# Patient Record
Sex: MALE | Race: WHITE | NOT HISPANIC OR LATINO | Employment: FULL TIME | URBAN - METROPOLITAN AREA
[De-identification: names, ages, dates, MRNs, and addresses within clinical notes are randomized per-mention and may not be internally consistent; named-entity substitution may affect disease eponyms.]

---

## 2018-08-08 ENCOUNTER — OFFICE VISIT (OUTPATIENT)
Dept: FAMILY MEDICINE | Facility: CLINIC | Age: 28
End: 2018-08-08
Payer: COMMERCIAL

## 2018-08-08 VITALS
WEIGHT: 181 LBS | TEMPERATURE: 98.4 F | HEART RATE: 66 BPM | SYSTOLIC BLOOD PRESSURE: 120 MMHG | DIASTOLIC BLOOD PRESSURE: 70 MMHG | BODY MASS INDEX: 23.99 KG/M2 | HEIGHT: 73 IN

## 2018-08-08 DIAGNOSIS — Z00.00 ROUTINE GENERAL MEDICAL EXAMINATION AT A HEALTH CARE FACILITY: Primary | ICD-10-CM

## 2018-08-08 DIAGNOSIS — Z82.49 FAMILY HISTORY OF CARDIAC ARRHYTHMIA: ICD-10-CM

## 2018-08-08 DIAGNOSIS — B97.7 HPV (HUMAN PAPILLOMA VIRUS) INFECTION: ICD-10-CM

## 2018-08-08 PROCEDURE — 93000 ELECTROCARDIOGRAM COMPLETE: CPT | Performed by: FAMILY MEDICINE

## 2018-08-08 PROCEDURE — 99395 PREV VISIT EST AGE 18-39: CPT | Mod: 25 | Performed by: FAMILY MEDICINE

## 2018-08-08 PROCEDURE — 54050 DESTRUCTION PENIS LESION(S): CPT | Performed by: FAMILY MEDICINE

## 2018-08-08 ASSESSMENT — ENCOUNTER SYMPTOMS
HEARTBURN: 0
MYALGIAS: 0
HEADACHES: 0
EYE PAIN: 0
SHORTNESS OF BREATH: 0
SORE THROAT: 0
DIARRHEA: 0
COUGH: 0
NERVOUS/ANXIOUS: 0
FREQUENCY: 0
DIZZINESS: 0
CONSTIPATION: 0
PALPITATIONS: 0
WEAKNESS: 0
ARTHRALGIAS: 0
CHILLS: 0
ABDOMINAL PAIN: 0
NAUSEA: 0
HEMATURIA: 0
JOINT SWELLING: 0
DYSURIA: 0
FEVER: 0
HEMATOCHEZIA: 0
PARESTHESIAS: 0

## 2018-08-08 NOTE — MR AVS SNAPSHOT
After Visit Summary   8/8/2018    Gus Segundo    MRN: [id number]           Patient Information     Date Of Birth          1990        Visit Information        Provider Department      8/8/2018 3:20 PM Ramy French MD [facility]        Today's Diagnoses     Routine general medical examination at a health care facility    -  1    Family history of cardiac arrhythmia        HPV (human papilloma virus) infection          Care Instructions      Preventive Health Recommendations  Male Ages 26 - 39    Yearly exam:             See your health care provider every year in order to  o   Review health changes.   o   Discuss preventive care.    o   Review your medicines if your doctor has prescribed any.    You should be tested each year for STDs (sexually transmitted diseases), if you re at risk.     After age 35, talk to your provider about cholesterol testing. If you are at risk for heart disease, have your cholesterol tested at least every 5 years.     If you are at risk for diabetes, you should have a diabetes test (fasting glucose).  Shots: Get a flu shot each year. Get a tetanus shot every 10 years.     Nutrition:    Eat at least 5 servings of fruits and vegetables daily.     Eat whole-grain bread, whole-wheat pasta and brown rice instead of white grains and rice.     Get adequate Calcium and Vitamin D.     Lifestyle    Exercise for at least 150 minutes a week (30 minutes a day, 5 days a week). This will help you control your weight and prevent disease.     Limit alcohol to one drink per day.     No smoking.     Wear sunscreen to prevent skin cancer.     See your dentist every six months for an exam and cleaning.       [facility]   Discharged by : naveen  Paper scripts provided to patient :  none     If you have any questions regarding your visit please contact your care team:     Team Gold                Clinic Hours Telephone Number     Dr. Hernandez  Srikanth Montillarhonda Jeffjina, CNP 7am-7pm  Monday - Thursday   7am-5pm  Fridays  [phone]   (Appointment scheduling available 24/7)     RN Line  [phone] option 2     Urgent Care - [place] - 11am-9pm Monday-Friday Saturday-Sunday- 9am-5pm     [place] -   5pm-9pm Monday-Friday Saturday-Sunday- 9am-5pm    [phone] - [place]    [phone] - [place]       For a Price Quote for your services, please call our Consumer Price Line at [phone].     What options do I have for visits at the clinic other than the traditional office visit?     To expand how we care for you, many of our providers are utilizing electronic visits (e-visits) and telephone visits, when medically appropriate, for interactions with their patients rather than a visit in the clinic. We also offer nurse visits for many medical concerns. Just like any other service, we will bill your insurance company for this type of visit based on time spent on the phone with your provider. Not all insurance companies cover these visits. Please check with your medical insurance if this type of visit is covered. You will be responsible for any charges that are not paid by your insurance.   E-visits via [employer]Jar: generally incur a $35.00 fee.     Telephone visits:  Time spent on the phone: *charged based on time that is spent on the phone in increments of 10 minutes. Estimated cost:   5-10 mins $30.00   11-20 mins. $59.00   21-30 mins. $85.00       Use [employer] (secure email communication and access to your chart) to send your primary care provider a message or make an appointment. Ask someone on your Team how to sign up for [employer].     As always, Thank you for trusting us with your health care needs!      [facility]:    Scheduling Appointments  [facility]  Call: [phone]    [facility]  Call:  [phone]    [facility]  Call: [phone]    For Gastroenterology referrals   [facility]   Clinics and Surgery Center, 4th Floor   [address] [place] [address]   Appointments: [phone]    WHERE TO GO FOR CARE?  Clinic    Make an appointment if you:       Are sick (cold, cough, flu, sore throat, earache or in pain).       Have a small injury (sprain, small cut, burn or broken bone).       Need a physical exam, Pap smear, vaccine or prescription refill.       Have questions about your health or medicines.    To reach us:      Call [phone] ([phone]). Open 24 hours every day. (For counseling services, call [phone].)    Log into [employer] at [employer]. (Visit [email or web address] to create an account.) Hospital emergency room    An emergency is a serious or life- threatening problem that must be treated right away.    Call 675 or get to the hospital if you have:      Very bad or sudden:            - Chest pain or pressure         - Bleeding         - Head or belly pain         - Dizziness or trouble seeing, walking or                          Speaking      Problems breathing      Blood in your vomit or you are coughing up blood      A major injury (knocked out, loss of a finger or limb, rape, broken bone protruding from skin)    A mental health crisis. (Or call the Mental Health Crisis line at 1-449.659.4371 or Suicide Prevention Hotline at 1-701.105.4687.)    Open 24 hours every day. You don't need an appointment.     Urgent care    Visit urgent care for sickness or small injuries when the clinic is closed. You don't need an appointment. To check hours or find an urgent care near you, visit [email or web address]. Online care    Get online care from [facility] for more than 70 common problems, like colds, allergies and infections. Open 24 hours every day at:   [email or web address]   Need help deciding?    For advice about where to be seen,  "you may call your clinic and ask to speak with a nurse. We're here for you 24 hours every day.         If you are deaf or hard of hearing, please let us know. We provide many free services including sign language interpreters, oral interpreters, TTYs, telephone amplifiers, note takers and written materials.                   Follow-ups after your visit        Follow-up notes from your care team     Return in about 1 year (around 8/8/2019) for Physical Exam.      Who to contact     If you have questions or need follow up information about today's clinic visit or your schedule please contact [facility] directly at [phone].  Normal or non-critical lab and imaging results will be communicated to you by MyChart, letter or phone within 4 business days after the clinic has received the results. If you do not hear from us within 7 days, please contact the clinic through PrivacyCentralhart or phone. If you have a critical or abnormal lab result, we will notify you by phone as soon as possible.  Submit refill requests through [employer] or call your pharmacy and they will forward the refill request to us. Please allow 3 business days for your refill to be completed.          Additional Information About Your Visit        PrivacyCentralharNeventum Information     [employer] gives you secure access to your electronic health record. If you see a primary care provider, you can also send messages to your care team and make appointments. If you have questions, please call your primary care clinic.  If you do not have a primary care provider, please call [phone] and they will assist you.        Care EveryWhere ID     This is your Care EveryWhere ID. This could be used by other organizations to access your [place] medical records  [id number]        Your Vitals Were     Pulse Temperature Height BMI (Body Mass Index)          66 98.4  F (36.9  C) (Oral) 6' 0.5\" (1.842 m) 24.21 kg/m2         Blood Pressure from Last 3 Encounters: "   08/08/18 120/70   10/14/16 122/62   10/03/16 136/67    Weight from Last 3 Encounters:   08/08/18 181 lb (82.1 kg)   10/14/16 186 lb (84.4 kg)   10/03/16 187 lb 14.4 oz (85.2 kg)              We Performed the Following     EKG 12-lead complete w/read - Clinics        Primary Care Provider Office Phone # Fax #    Ramy French [phone]       [address] [facility] [address]        Equal Access to Services     [facility]: Hadii aad ku hadasho Soomaali, waaxda luqadaha, qaybta kaalmada adeegyaevelia, tr stratton . So [facility] [phone].    ATENCIÓN: Si habla español, tiene a garcia disposición servicios gratuitos de asistencia lingüística. [facility] [phone].    We comply with applicable federal civil rights laws and [place] laws. We do not discriminate on the basis of race, color, national origin, age, disability, sex, sexual orientation, or gender identity.            Thank you!     Thank you for choosing [facility]  for your care. Our goal is always to provide you with excellent care. Hearing back from our patients is one way we can continue to improve our services. Please take a few minutes to complete the written survey that you may receive in the mail after your visit with us. Thank you!             Your Updated Medication List - Protect others around you: Learn how to safely use, store and throw away your medicines at www.disposemymeds.org.      Notice  As of 8/8/2018  4:26 PM    You have not been prescribed any medications.

## 2018-08-08 NOTE — PATIENT INSTRUCTIONS
Preventive Health Recommendations  Male Ages 26 - 39    Yearly exam:             See your health care provider every year in order to  o   Review health changes.   o   Discuss preventive care.    o   Review your medicines if your doctor has prescribed any.    You should be tested each year for STDs (sexually transmitted diseases), if you re at risk.     After age 35, talk to your provider about cholesterol testing. If you are at risk for heart disease, have your cholesterol tested at least every 5 years.     If you are at risk for diabetes, you should have a diabetes test (fasting glucose).  Shots: Get a flu shot each year. Get a tetanus shot every 10 years.     Nutrition:    Eat at least 5 servings of fruits and vegetables daily.     Eat whole-grain bread, whole-wheat pasta and brown rice instead of white grains and rice.     Get adequate Calcium and Vitamin D.     Lifestyle    Exercise for at least 150 minutes a week (30 minutes a day, 5 days a week). This will help you control your weight and prevent disease.     Limit alcohol to one drink per day.     No smoking.     Wear sunscreen to prevent skin cancer.     See your dentist every six months for an exam and cleaning.       [facility]   Discharged by : naveen  Paper scripts provided to patient :  none     If you have any questions regarding your visit please contact your care team:     Team Gold                Clinic Hours Telephone Number     Dr. Mary Pearson, CNP 7am-7pm  Monday - Thursday   7am-5pm  Fridays  [phone]   (Appointment scheduling available 24/7)     RN Line  [phone] option 2     Urgent Care - [place] - 11am-9pm Monday-Friday Saturday-Sunday- 9am-5pm     [place] -   5pm-9pm Monday-Friday Saturday-Sunday- 9am-5pm    [phone] - [place]    [phone] - [place]       For a Price Quote for your services,  please call our Consumer Price Line at [phone].     What options do I have for visits at the clinic other than the traditional office visit?     To expand how we care for you, many of our providers are utilizing electronic visits (e-visits) and telephone visits, when medically appropriate, for interactions with their patients rather than a visit in the clinic. We also offer nurse visits for many medical concerns. Just like any other service, we will bill your insurance company for this type of visit based on time spent on the phone with your provider. Not all insurance companies cover these visits. Please check with your medical insurance if this type of visit is covered. You will be responsible for any charges that are not paid by your insurance.   E-visits via [employer]: generally incur a $35.00 fee.     Telephone visits:  Time spent on the phone: *charged based on time that is spent on the phone in increments of 10 minutes. Estimated cost:   5-10 mins $30.00   11-20 mins. $59.00   21-30 mins. $85.00       Use [employer] (secure email communication and access to your chart) to send your primary care provider a message or make an appointment. Ask someone on your Team how to sign up for [employer].     As always, Thank you for trusting us with your health care needs!      [facility]:    Scheduling Appointments  [place] [facility]  Call: [phone]    [facility]  Call: [phone]    [facility]  Call: [phone]    For Gastroenterology referrals   [facility] Gastroenterology   Clinics and Surgery Center, 4th Floor   [address] [place] [id number]   Appointments: [phone]    WHERE TO GO FOR CARE?  Clinic    Make an appointment if you:       Are sick (cold, cough, flu, sore throat, earache or in pain).       Have a small injury (sprain, small cut, burn or broken bone).       Need a physical exam, Pap smear, vaccine or  prescription refill.       Have questions about your health or medicines.    To reach us:      Call [phone] ([phone]). Open 24 hours every day. (For counseling services, call [phone].)    Log into [employer] at [employer]. (Visit [email or web address] to create an account.) Hospital emergency room    An emergency is a serious or life- threatening problem that must be treated right away.    Call 911 or get to the hospital if you have:      Very bad or sudden:            - Chest pain or pressure         - Bleeding         - Head or belly pain         - Dizziness or trouble seeing, walking or                          Speaking      Problems breathing      Blood in your vomit or you are coughing up blood      A major injury (knocked out, loss of a finger or limb, rape, broken bone protruding from skin)    A mental health crisis. (Or call the Mental Health Crisis line at 1-244.113.2920 or Suicide Prevention Hotline at 1-493.141.8658.)    Open 24 hours every day. You don't need an appointment.     Urgent care    Visit urgent care for sickness or small injuries when the clinic is closed. You don't need an appointment. To check hours or find an urgent care near you, visit [email or web address]. Online care    Get [facility] from [facility] for more than 70 common problems, like colds, allergies and infections. Open 24 hours every day at:   [email or web address]   Need help deciding?    For advice about where to be seen, you may call your clinic and ask to speak with a nurse. We're here for you 24 hours every day.         If you are deaf or hard of hearing, please let us know. We provide many free services including sign language interpreters, oral interpreters, TTYs, telephone amplifiers, note takers and written materials.

## 2018-08-08 NOTE — PROGRESS NOTES
SUBJECTIVE:   CC: Gus Segundo is an 28 year old male who presents for preventative health visit.     Physical   Annual:     Getting at least 3 servings of Calcium per day:  Yes    Bi-annual eye exam:  NO    Dental care twice a year:  Yes    Sleep apnea or symptoms of sleep apnea:  None    Diet:  Regular (no restrictions)    Frequency of exercise:  6-7 days/week    Duration of exercise:  45-60 minutes    Taking medications regularly:  Yes    Medication side effects:  Not applicable    Additional concerns today:  YES        Other Concerns:  Genetic family heart condition in relatives- ARCV ?   He has no symptoms at this time.  Exercise tolerance good.  Cousin is son of maternal aunt.  Genetics unknown.  Female cousin (sister to make cousin affected) is also affected.  Presented in cousin age 40's, sister was asymptomatic.  Referral to cardiology? Maybe get baseline EKG    Also has some concern regarding warts in the pubic trigone.      Today's PHQ-2 Score:   PHQ-2 ( 1999 Pfizer) 8/8/2018   Q1: Little interest or pleasure in doing things 0   Q2: Feeling down, depressed or hopeless 0   PHQ-2 Score 0   Q1: Little interest or pleasure in doing things Not at all   Q2: Feeling down, depressed or hopeless Not at all   PHQ-2 Score 0       Abuse: Current or Past(Physical, Sexual or Emotional)- No  Do you feel safe in your environment - Yes    Social History   Substance Use Topics     Smoking status: Never Smoker     Smokeless tobacco: Never Used     Alcohol use Yes      Comment: 1-2 Drinks per week     Alcohol Use 8/8/2018   If you drink alcohol do you typically have greater than 3 drinks per day OR greater than 7 drinks per week? No   No flowsheet data found.    Last PSA: No results found for: PSA    Reviewed orders with patient. Reviewed health maintenance and updated orders accordingly - Yes  There is no problem list on file for this patient.    Past Surgical History:   Procedure Laterality Date     PHOTOREFRACTIVE  "KERATECTOMY Bilateral     vision correction       Social History   Substance Use Topics     Smoking status: Never Smoker     Smokeless tobacco: Never Used     Alcohol use Yes      Comment: 1-2 Drinks per week     Family History   Problem Relation Age of Onset     Asthma Father      Coronary Artery Disease Maternal Grandmother      Diabetes Maternal Grandmother      Seizure Disorder Sister      child, improved with growth     Heart Defect Cousin      Genetic condition -ARVC         No current outpatient prescriptions on file.     No Known Allergies    Reviewed and updated as needed this visit by clinical staff  Tobacco  Allergies  Meds  Problems  Med Hx  Surg Hx  Fam Hx  Soc Hx          Reviewed and updated as needed this visit by Provider  Tobacco  Allergies  Meds  Problems  Med Hx  Surg Hx  Fam Hx  Soc Hx         History reviewed. No pertinent past medical history.   Past Surgical History:   Procedure Laterality Date     PHOTOREFRACTIVE KERATECTOMY Bilateral     vision correction       Review of Systems   Constitutional: Negative for chills and fever.   HENT: Positive for congestion. Negative for ear pain, hearing loss and sore throat.    Eyes: Negative for pain and visual disturbance.   Respiratory: Negative for cough and shortness of breath.    Cardiovascular: Negative for chest pain, palpitations and peripheral edema.   Gastrointestinal: Negative for abdominal pain, constipation, diarrhea, heartburn, hematochezia and nausea.   Genitourinary: Positive for genital sores. Negative for discharge, dysuria, frequency, hematuria, impotence and urgency.   Musculoskeletal: Negative for arthralgias, joint swelling and myalgias.   Skin: Negative for rash.   Neurological: Negative for dizziness, weakness, headaches and paresthesias.   Psychiatric/Behavioral: Negative for mood changes. The patient is not nervous/anxious.          OBJECTIVE:   /70  Pulse 66  Temp 98.4  F (36.9  C) (Oral)  Ht 6' 0.5\" " (1.842 m)  Wt 181 lb (82.1 kg)  BMI 24.21 kg/m2    Physical Exam  GENERAL: healthy, alert and no distress  EYES: Eyes grossly normal to inspection, PERRL and conjunctivae and sclerae normal  HENT: ear canals and TM's normal, nose and mouth without ulcers or lesions  NECK: no adenopathy, no asymmetry, masses, or scars and thyroid normal to palpation  RESP: lungs clear to auscultation - no rales, rhonchi or wheezes  CV: regular rate and rhythm, normal S1 S2, no S3 or S4, no murmur, click or rub, no peripheral edema and peripheral pulses strong  ABDOMEN: soft, nontender, no hepatosplenomegaly, no masses and bowel sounds normal   (male): normal male genitalia without lesions or urethral discharge, no hernia   (male): two 2-3 mm flesh colored lesions at the base of the penis  MS: no gross musculoskeletal defects noted, no edema  SKIN: no suspicious lesions or rashes  NEURO: Normal strength and tone, mentation intact and speech normal  PSYCH: mentation appears normal, affect normal/bright    Diagnostic Test Results:  none   EKG - sinus bradycardia, normal axis, normal intervals, no acute ST/T changes c/w ischemia, no LVH by voltage criteria, there are no prior tracings available, I do not appreciate any of the changes suggestive ARVD.    ASSESSMENT/PLAN:   1. Routine general medical examination at a health care facility  Routine wellness issues for age were reviewed.  Recheck 1-3 years.    2. Family history of cardiac arrhythmia  EKG done today and no concerning findings.  Discussed genetic risk which seems low at this time given occurrence on cousin and not first degree relative.  Follow up if any symptom concerns and/or if genetics get clarified and it is present on his side of the family (maternal aunt and patient's mother).  - EKG 12-lead complete w/read - Clinics    3. HPV (human papilloma virus) infection  LN x 2 today to each of the lesions, follow up prn.  Ok to monitor as well.      COUNSELING:   Reviewed  "preventive health counseling, as reflected in patient instructions       Regular exercise       Healthy diet/nutrition       Vision screening       Hearing screening       Safe sex practices/STD prevention    BP Readings from Last 1 Encounters:   08/08/18 120/70     Estimated body mass index is 24.21 kg/(m^2) as calculated from the following:    Height as of this encounter: 6' 0.5\" (1.842 m).    Weight as of this encounter: 181 lb (82.1 kg).           reports that he has never smoked. He has never used smokeless tobacco.      Counseling Resources:  ATP IV Guidelines  Pooled Cohorts Equation Calculator  FRAX Risk Assessment  ICSI Preventive Guidelines  Dietary Guidelines for Americans, 2010  USDA's MyPlate  ASA Prophylaxis  Lung CA Screening    Ramy French MD  [facility]  "

## 2019-09-30 ENCOUNTER — HEALTH MAINTENANCE LETTER (OUTPATIENT)
Age: 29
End: 2019-09-30

## 2021-01-15 ENCOUNTER — HEALTH MAINTENANCE LETTER (OUTPATIENT)
Age: 31
End: 2021-01-15

## 2021-10-24 ENCOUNTER — HEALTH MAINTENANCE LETTER (OUTPATIENT)
Age: 31
End: 2021-10-24

## 2022-02-13 ENCOUNTER — HEALTH MAINTENANCE LETTER (OUTPATIENT)
Age: 32
End: 2022-02-13

## 2022-10-15 ENCOUNTER — HEALTH MAINTENANCE LETTER (OUTPATIENT)
Age: 32
End: 2022-10-15

## 2023-02-11 ENCOUNTER — HOSPITAL ENCOUNTER (EMERGENCY)
Facility: HOSPITAL | Age: 33
Discharge: HOME | End: 2023-02-12
Attending: EMERGENCY MEDICINE | Admitting: EMERGENCY MEDICINE
Payer: COMMERCIAL

## 2023-02-11 DIAGNOSIS — T15.92XA ACUTE FOREIGN BODY OF LEFT EYE, INITIAL ENCOUNTER: ICD-10-CM

## 2023-02-11 DIAGNOSIS — S05.02XA ABRASION OF LEFT CORNEA, INITIAL ENCOUNTER: ICD-10-CM

## 2023-02-11 PROCEDURE — 99284 EMERGENCY DEPT VISIT MOD MDM: CPT

## 2023-02-11 PROCEDURE — 250N000009 HC RX 250: Performed by: EMERGENCY MEDICINE

## 2023-02-11 RX ORDER — TETRACAINE HYDROCHLORIDE 5 MG/ML
1-2 SOLUTION OPHTHALMIC ONCE
Status: COMPLETED | OUTPATIENT
Start: 2023-02-12 | End: 2023-02-11

## 2023-02-11 RX ADMIN — FLUORESCEIN SODIUM 1 STRIP: 1 STRIP OPHTHALMIC at 23:48

## 2023-02-11 RX ADMIN — TETRACAINE HYDROCHLORIDE 2 DROP: 5 SOLUTION OPHTHALMIC at 23:48

## 2023-02-12 VITALS
OXYGEN SATURATION: 97 % | WEIGHT: 185 LBS | HEIGHT: 72 IN | SYSTOLIC BLOOD PRESSURE: 130 MMHG | TEMPERATURE: 98 F | HEART RATE: 62 BPM | DIASTOLIC BLOOD PRESSURE: 77 MMHG | BODY MASS INDEX: 25.06 KG/M2 | RESPIRATION RATE: 16 BRPM

## 2023-02-12 PROCEDURE — 250N000009 HC RX 250: Performed by: EMERGENCY MEDICINE

## 2023-02-12 RX ORDER — SOFT LENS RINSE,STORE SOLUTION
15 SOLUTION, NON-ORAL MISCELLANEOUS ONCE
Status: DISCONTINUED | OUTPATIENT
Start: 2023-02-12 | End: 2023-02-12

## 2023-02-12 RX ORDER — PURIFIED WATER 986 MG/ML
1 SOLUTION OPHTHALMIC ONCE
Status: COMPLETED | OUTPATIENT
Start: 2023-02-12 | End: 2023-02-12

## 2023-02-12 RX ORDER — ERYTHROMYCIN 5 MG/G
OINTMENT OPHTHALMIC ONCE
Status: COMPLETED | OUTPATIENT
Start: 2023-02-12 | End: 2023-02-12

## 2023-02-12 RX ORDER — HYDROCODONE BITARTRATE AND ACETAMINOPHEN 5; 325 MG/1; MG/1
1 TABLET ORAL EVERY 8 HOURS PRN
Qty: 6 TABLET | Refills: 0 | Status: SHIPPED | OUTPATIENT
Start: 2023-02-12 | End: 2023-02-14

## 2023-02-12 RX ORDER — ERYTHROMYCIN 5 MG/G
0.5 OINTMENT OPHTHALMIC 4 TIMES DAILY
Qty: 3.5 G | Refills: 0 | Status: SHIPPED | OUTPATIENT
Start: 2023-02-12

## 2023-02-12 RX ADMIN — ERYTHROMYCIN: 5 OINTMENT OPHTHALMIC at 00:58

## 2023-02-12 RX ADMIN — PURIFIED WATER 1 DROP: 986 SOLUTION OPHTHALMIC at 00:56

## 2023-02-12 ASSESSMENT — ACTIVITIES OF DAILY LIVING (ADL): ADLS_ACUITY_SCORE: 35

## 2023-02-12 ASSESSMENT — VISUAL ACUITY
OS: 20/15
OU: 20/15
OD: 20/15

## 2023-02-12 NOTE — ED TRIAGE NOTES
Pt reports that he was doing some home renovations today working with fiberglass and dry wall. Pt did have eye protection on during work. Pt finished working at 1600. At about 2100 he started having pain in his left eye. Pt is trouble opening the left eye and reports pain. Pt tried flushing his eye at home but is still having problems.

## 2023-02-12 NOTE — ED PROVIDER NOTES
EMERGENCY DEPARTMENT NOTE     Name: Gus Segundo    Age/Sex: 32 year old male   MRN: [id number]   Evaluation Date & Time:  2/11/2023 11:36 PM    PCP:    Ramy French   ED Provider: Alex Mustafa D.O.       CHIEF COMPLAINT    Eye Problem       DIAGNOSIS & DISPOSITION     1. Acute foreign body of left eye, initial encounter    2. Abrasion of left cornea, initial encounter      DISPOSITION:Home    At the conclusion of the encounter I discussed the results of all of the tests and the disposition. The questions were answered. The patient or family acknowledged understanding and was agreeable with the care plan.    TOTAL CRITICAL CARE TIME (EXCLUDING PROCEDURES): Not applicable    PROCEDURES:   None    EMERGENCY DEPARTMENT COURSE/MEDICAL DECISION MAKING   11:42 PM I met with the patient to gather history and to perform my initial exam.  We discussed treatment options and the plan for care while in the Emergency Department.    Gus Segundo is a 32 year old male with relevant past history of s/p photorefractive keratectomy who presents to the emergency department for evaluation of an eye pain after putting in drywall and fiber glass insulation today.         Triage note reviewed:Pt reports that he was doing some home renovations today working with fiberglass and dry wall. Pt did have eye protection on during work. Pt finished working at 1600. At about 2100 he started having pain in his left eye. Pt is trouble opening the left eye and reports pain. Pt tried flushing his eye at home but is still having problems.         Differential  diagnosis  considered included but not limited to corneal abrasion, ocular foreign body, iritis  Diagnostic studies:  Imaging:  No orders to display      Lab:  Labs Ordered and Resulted from Time of ED Arrival to Time of ED Departure - No data to display   Interventions: Tetracaine, erythromycin eye ointment, eye irrigation  Medical decision making: Patient on exam found to have  large foreign body which appeared to be a piece of drywall paper which was removed.  Slit-lamp exam showed extensive corneal abrasion approximately 70% with no deep ulceration.  The acuity was 20/15 each eye.  Patient will be discharged.  Continue erythromycin ointment every 4 hours.  He was given short course of Vicodin for pain not improved with Tylenol or ibuprofen.  Patient is given referral to [facility] with recommendation for follow-up within 48 hours.  Return criteria discussed if uncontrolled pain or problems with follow-up or return to the emergency department.  Medical Decision Making    History:    Supplemental history from: Documented in chart, if applicable    External Record(s) reviewed: Documented in chart, if applicable.    Work Up:    Chart documentation includes differential considered and any EKGs or imaging independently interpreted by provider, where specified.    In additional to work up documented, I considered the following work up: Documented in chart, if applicable. and N/A    External consultation:    Discussion of management with another provider: Documented in chart, if applicable    Complicating factors:    Care impacted by chronic illness: N/A    Care affected by social determinants of health: N/A    Disposition considerations: Discharge. I prescribed additional prescription strength medication(s) as charted. N/A.      ED INTERVENTIONS     Medications   tetracaine (PONTOCAINE) 0.5 % ophthalmic solution 1-2 drop (2 drops Left Eye Given by Other 2/11/23 2348)   fluorescein (FUL-DO) ophthalmic strip 1 strip (1 strip Right Eye Given by Other 2/11/23 2348)   erythromycin (ROMYCIN) ophthalmic ointment ( Right Eye Given 2/12/23 0058)   balanced salts (EYE STREAM) ophthalmic solution 1 drop (1 drop Left Eye Given 2/12/23 0056)       DISCHARGE MEDICATIONS        Review of your medicines      START taking      Dose / Directions   erythromycin 5 MG/GM ophthalmic ointment  Commonly known as:  ROMYCIN      Dose: 0.5 inch  Place 0.5 inches Into the left eye 4 times daily  Quantity: 3.5 g  Refills: 0     HYDROcodone-acetaminophen 5-325 MG tablet  Commonly known as: NORCO      Dose: 1 tablet  Take 1 tablet by mouth every 8 hours as needed for severe pain (7-10)  Quantity: 6 tablet  Refills: 0           Where to get your medicines      Some of these will need a paper prescription and others can be bought over the counter. Ask your nurse if you have questions.    Bring a paper prescription for each of these medications    erythromycin 5 MG/GM ophthalmic ointment    HYDROcodone-acetaminophen 5-325 MG tablet           INFORMATION SOURCE AND LIMITATIONS    History/Exam limitations: None.   Patient information was obtained from: the patient   Use of : N/A    HISTORY OF PRESENT ILLNESS   Gus Segundo is a 32 year old year old male with a relevant past history of photorefractive keratectomy, who presents to this ED via walk in for evaluation of eye pain.    The patient reports the sudden onset of left eye pain this evening around 2100 while he was taking a shower. He washed the eye out with water and saline contact solution, neither of which provided relief. He notes that he did put in fiber glass insulation this afternoon and didn't change out of his work clothes until he took a shower this evening, but he did wear safety glasses while putting in the insulation. He denies wearing contacts. The patient denies any other symptoms or complaints at this time.     REVIEW OF SYSTEMS:   Constitutional: Negative for  fever.   HENT: Negative for URI symptoms or sore throat. Positive for left eye pain  Cardiac: Negative for  chest pain,palpitations, near syncope or syncope  Respiratory: Negative for cough and shortness of breath.    Gastrointestinal: Negative for abdominal pain, nausea, vomiting, constipation, diarrhea, rectal bleeding or melena.  Genitourinary: Negative for dysuria, flank pain and hematuria.    Musculoskeletal: Negative for back pain.   Skin: Negative for  rash  Neurological: Negative for dizziness, headache, syncope, speech difficulty, unilateral weakness or imbalance with walking.   Hematological: Negative for adenopathy. Does not bruise/bleed easily.   Psychiatric/Behavioral: Negative for confusion.     PATIENT HISTORY   No past medical history on file.  There is no problem list on file for this patient.    Past Surgical History:   Procedure Laterality Date     PHOTOREFRACTIVE KERATECTOMY Bilateral     vision correction     Social Histrory  Smoking: Never  Alcohol Use: Not asked  Drug Use: Not asked  No Known Allergies    OUTPATIENT MEDICATIONS     Discharge Medication List as of 2/12/2023 12:54 AM      START taking these medications    Details   erythromycin (ROMYCIN) 5 MG/GM ophthalmic ointment Place 0.5 inches Into the left eye 4 times dailyDisp-3.5 g, R-0Local Print      HYDROcodone-acetaminophen (NORCO) 5-325 MG tablet Take 1 tablet by mouth every 8 hours as needed for severe pain (7-10), Disp-6 tablet, R-0, Local Print            Vitals:    02/11/23 2332 02/12/23 0104   BP: (!) 140/77 130/77   Pulse: 59 62   Resp: 16    Temp: 98  F (36.7  C)    TempSrc: Oral    SpO2: 100% 97%   Weight: 83.9 kg (185 lb)    Height: 1.829 m (6')        Physical Exam   Constitutional: Oriented to person, place, and time. Appears well-developed and well-nourished.   HEENT:    Head: Atraumatic.   Neck: Normal range of motion. Neck supple.   Cardiovascular: Normal rate, regular rhythm and normal heart sounds.    Pulmonary/Chest: Normal effort  and breath sounds normal.   Abdominal: Soft. Bowel sounds are normal.   Musculoskeletal: Normal range of motion.   Neurological: Alert and oriented to person, place, and time. Normal strength. No sensory deficit. No cranial nerve deficit.  Skin: Skin is warm and dry.   Psychiatric: Normal mood and affect. Behavior is normal. Thought content normal.     DIAGNOSTICS    LABORATORY  FINDINGS (REVIEWED AND INTERPRETED):  Labs Ordered and Resulted from Time of ED Arrival to Time of ED Departure - No data to display      IMAGING (REVIEWED AND INTERPRETED):  No orders to display     I, Sarai Canela am serving as a scribe to document services personally performed by Alex Mustafa DO, based on my observation and the provider's statements to me. I, Alex Mustafa DO attest that Sarailuciana Canela is acting in a scribe capacity, has observed my performance of the services and has documented them in accordance with my direction.     Alex Mustafa D.O.  EMERGENCY MEDICINE   02/11/23  [facility] EMERGENCY DEPARTMENT  [address] [place] [id number]  [phone]  Dept: [phone]     Alex Mustafa DO  02/13/23 0154

## 2023-02-12 NOTE — ED NOTES
Patient discharged from ED. Discharge paperwork discussed, no questions at this time and patient agreeable about discharge.

## 2023-02-12 NOTE — DISCHARGE INSTRUCTIONS
Continue erythromycin eye ointment 4 times daily.  Take ibuprofen 600 mg every 8 hours and Tylenol 1 g every 8 hours for pain management.  May use Vicodin 1 every 6 hours for pain not controlled with Tylenol or ibuprofen.  See [facility] in follow-up early next week.  Return to the emergency department if uncontrolled pain or problems with follow-up.

## 2023-03-26 ENCOUNTER — HEALTH MAINTENANCE LETTER (OUTPATIENT)
Age: 33
End: 2023-03-26

## 2024-05-26 ENCOUNTER — HEALTH MAINTENANCE LETTER (OUTPATIENT)
Age: 34
End: 2024-05-26

## 2025-06-14 ENCOUNTER — HEALTH MAINTENANCE LETTER (OUTPATIENT)
Age: 35
End: 2025-06-14